# Patient Record
Sex: FEMALE | Race: WHITE | NOT HISPANIC OR LATINO | Employment: FULL TIME | URBAN - METROPOLITAN AREA
[De-identification: names, ages, dates, MRNs, and addresses within clinical notes are randomized per-mention and may not be internally consistent; named-entity substitution may affect disease eponyms.]

---

## 2021-09-10 ENCOUNTER — HOSPITAL ENCOUNTER (EMERGENCY)
Facility: HOSPITAL | Age: 56
Discharge: HOME | End: 2021-09-10
Attending: SURGERY
Payer: COMMERCIAL

## 2021-09-10 VITALS
RESPIRATION RATE: 20 BRPM | OXYGEN SATURATION: 98 % | BODY MASS INDEX: 26.73 KG/M2 | WEIGHT: 170.63 LBS | DIASTOLIC BLOOD PRESSURE: 84 MMHG | HEART RATE: 63 BPM | TEMPERATURE: 97 F | SYSTOLIC BLOOD PRESSURE: 173 MMHG

## 2021-09-10 DIAGNOSIS — R00.2 PALPITATIONS: ICD-10-CM

## 2021-09-10 DIAGNOSIS — F43.9 STRESS: Primary | ICD-10-CM

## 2021-09-10 LAB
ALBUMIN SERPL BCP-MCNC: 4.1 G/DL (ref 3.5–5.2)
ALP SERPL-CCNC: 111 U/L (ref 55–135)
ALT SERPL W/O P-5'-P-CCNC: 35 U/L (ref 10–44)
ANION GAP SERPL CALC-SCNC: 11 MMOL/L (ref 8–16)
AST SERPL-CCNC: 27 U/L (ref 10–40)
BASOPHILS # BLD AUTO: 0.03 K/UL (ref 0–0.2)
BASOPHILS NFR BLD: 0.4 % (ref 0–1.9)
BILIRUB SERPL-MCNC: 0.3 MG/DL (ref 0.1–1)
BNP SERPL-MCNC: 50 PG/ML (ref 0–99)
BUN SERPL-MCNC: 10 MG/DL (ref 6–20)
CALCIUM SERPL-MCNC: 9.5 MG/DL (ref 8.7–10.5)
CHLORIDE SERPL-SCNC: 108 MMOL/L (ref 95–110)
CK MB SERPL-MCNC: 1.3 NG/ML (ref 0.1–6.5)
CK MB SERPL-RTO: 1.3 % (ref 0–5)
CK SERPL-CCNC: 97 U/L (ref 20–180)
CK SERPL-CCNC: 97 U/L (ref 20–180)
CO2 SERPL-SCNC: 23 MMOL/L (ref 23–29)
CREAT SERPL-MCNC: 0.8 MG/DL (ref 0.5–1.4)
D DIMER PPP IA.FEU-MCNC: 0.25 MG/L FEU
DIFFERENTIAL METHOD: NORMAL
EOSINOPHIL # BLD AUTO: 0.1 K/UL (ref 0–0.5)
EOSINOPHIL NFR BLD: 1.6 % (ref 0–8)
ERYTHROCYTE [DISTWIDTH] IN BLOOD BY AUTOMATED COUNT: 12.7 % (ref 11.5–14.5)
EST. GFR  (AFRICAN AMERICAN): >60 ML/MIN/1.73 M^2
EST. GFR  (NON AFRICAN AMERICAN): >60 ML/MIN/1.73 M^2
GLUCOSE SERPL-MCNC: 81 MG/DL (ref 70–110)
HCT VFR BLD AUTO: 42.3 % (ref 37–48.5)
HGB BLD-MCNC: 13.7 G/DL (ref 12–16)
IMM GRANULOCYTES # BLD AUTO: 0.03 K/UL (ref 0–0.04)
IMM GRANULOCYTES NFR BLD AUTO: 0.4 % (ref 0–0.5)
LYMPHOCYTES # BLD AUTO: 2.3 K/UL (ref 1–4.8)
LYMPHOCYTES NFR BLD: 31.8 % (ref 18–48)
MCH RBC QN AUTO: 30 PG (ref 27–31)
MCHC RBC AUTO-ENTMCNC: 32.4 G/DL (ref 32–36)
MCV RBC AUTO: 93 FL (ref 82–98)
MONOCYTES # BLD AUTO: 0.6 K/UL (ref 0.3–1)
MONOCYTES NFR BLD: 8.3 % (ref 4–15)
NEUTROPHILS # BLD AUTO: 4.2 K/UL (ref 1.8–7.7)
NEUTROPHILS NFR BLD: 57.5 % (ref 38–73)
NRBC BLD-RTO: 0 /100 WBC
PLATELET # BLD AUTO: 243 K/UL (ref 150–450)
PMV BLD AUTO: 10.3 FL (ref 9.2–12.9)
POTASSIUM SERPL-SCNC: 4.2 MMOL/L (ref 3.5–5.1)
PROT SERPL-MCNC: 7.5 G/DL (ref 6–8.4)
RBC # BLD AUTO: 4.57 M/UL (ref 4–5.4)
SODIUM SERPL-SCNC: 142 MMOL/L (ref 136–145)
TROPONIN I SERPL DL<=0.01 NG/ML-MCNC: <0.006 NG/ML (ref 0–0.03)
WBC # BLD AUTO: 7.37 K/UL (ref 3.9–12.7)

## 2021-09-10 PROCEDURE — 36415 COLL VENOUS BLD VENIPUNCTURE: CPT | Performed by: SURGERY

## 2021-09-10 PROCEDURE — 85025 COMPLETE CBC W/AUTO DIFF WBC: CPT | Performed by: SURGERY

## 2021-09-10 PROCEDURE — 93010 ELECTROCARDIOGRAM REPORT: CPT | Mod: ,,, | Performed by: INTERNAL MEDICINE

## 2021-09-10 PROCEDURE — 82553 CREATINE MB FRACTION: CPT | Performed by: SURGERY

## 2021-09-10 PROCEDURE — 80053 COMPREHEN METABOLIC PANEL: CPT | Performed by: SURGERY

## 2021-09-10 PROCEDURE — 93010 EKG 12-LEAD: ICD-10-PCS | Mod: ,,, | Performed by: INTERNAL MEDICINE

## 2021-09-10 PROCEDURE — 99285 EMERGENCY DEPT VISIT HI MDM: CPT

## 2021-09-10 PROCEDURE — 25000003 PHARM REV CODE 250: Performed by: SURGERY

## 2021-09-10 PROCEDURE — 85379 FIBRIN DEGRADATION QUANT: CPT | Performed by: SURGERY

## 2021-09-10 PROCEDURE — 84484 ASSAY OF TROPONIN QUANT: CPT | Performed by: SURGERY

## 2021-09-10 PROCEDURE — 83880 ASSAY OF NATRIURETIC PEPTIDE: CPT | Performed by: SURGERY

## 2021-09-10 PROCEDURE — 93005 ELECTROCARDIOGRAM TRACING: CPT

## 2021-09-10 RX ORDER — ASPIRIN 325 MG
325 TABLET ORAL
Status: COMPLETED | OUTPATIENT
Start: 2021-09-10 | End: 2021-09-10

## 2021-09-10 RX ORDER — VORTIOXETINE 10 MG/1
1 TABLET, FILM COATED ORAL DAILY
COMMUNITY
Start: 2021-08-29 | End: 2023-09-12

## 2021-09-10 RX ADMIN — ASPIRIN 325 MG: 325 TABLET ORAL at 12:09

## 2023-09-12 ENCOUNTER — OFFICE VISIT (OUTPATIENT)
Dept: URGENT CARE | Facility: CLINIC | Age: 58
End: 2023-09-12
Payer: COMMERCIAL

## 2023-09-12 VITALS
TEMPERATURE: 99 F | HEART RATE: 93 BPM | RESPIRATION RATE: 16 BRPM | WEIGHT: 166.38 LBS | DIASTOLIC BLOOD PRESSURE: 75 MMHG | HEIGHT: 67 IN | OXYGEN SATURATION: 98 % | SYSTOLIC BLOOD PRESSURE: 105 MMHG | BODY MASS INDEX: 26.11 KG/M2

## 2023-09-12 DIAGNOSIS — U07.1 COVID-19: Primary | ICD-10-CM

## 2023-09-12 LAB
CTP QC/QA: YES
CTP QC/QA: YES
POC MOLECULAR INFLUENZA A AGN: NEGATIVE
POC MOLECULAR INFLUENZA B AGN: NEGATIVE
SARS-COV-2 RDRP RESP QL NAA+PROBE: POSITIVE

## 2023-09-12 PROCEDURE — 99203 OFFICE O/P NEW LOW 30 MIN: CPT | Mod: ,,,

## 2023-09-12 PROCEDURE — 99203 PR OFFICE/OUTPT VISIT, NEW, LEVL III, 30-44 MIN: ICD-10-PCS | Mod: ,,,

## 2023-09-12 PROCEDURE — 87635 SARS-COV-2 COVID-19 AMP PRB: CPT | Mod: QW,,,

## 2023-09-12 PROCEDURE — 87502 POCT INFLUENZA A/B MOLECULAR: ICD-10-PCS | Mod: QW,,,

## 2023-09-12 PROCEDURE — 87502 INFLUENZA DNA AMP PROBE: CPT | Mod: QW,,,

## 2023-09-12 PROCEDURE — 87635: ICD-10-PCS | Mod: QW,,,

## 2023-09-12 RX ORDER — BUSPIRONE HYDROCHLORIDE 5 MG/1
5 TABLET ORAL 2 TIMES DAILY PRN
COMMUNITY
Start: 2023-04-10

## 2023-09-12 RX ORDER — NIRMATRELVIR AND RITONAVIR 300-100 MG
KIT ORAL
Qty: 30 TABLET | Refills: 0 | Status: SHIPPED | OUTPATIENT
Start: 2023-09-12

## 2023-09-12 RX ORDER — NIRMATRELVIR AND RITONAVIR 300-100 MG
KIT ORAL
Qty: 30 TABLET | Refills: 0 | Status: SHIPPED | OUTPATIENT
Start: 2023-09-12 | End: 2023-09-12

## 2023-09-12 NOTE — LETTER
September 12, 2023      [facility]  [address] [address]  Phone: [phone]       Patient: Christin Ochoa   YOB: 1965  Date of Visit: 09/12/2023    To Whom It May Concern:    Tahmina Ochoa  was at [facility] on 09/12/2023. The patient may return to work/school on 09/14/2023. If you have any questions or concerns, or if I can be of further assistance, please do not hesitate to contact me.    Sincerely,    Emilee Gaines MA

## 2023-09-12 NOTE — PROGRESS NOTES
"Subjective:      Patient ID: Christin Ochoa is a 57 y.o. female.    Vitals:  height is 5' 7" (1.702 m) and weight is 75.5 kg (166 lb 6.4 oz). Her temperature is 98.7 °F (37.1 °C). Her blood pressure is 105/75 and her pulse is 93. Her respiration is 16 and oxygen saturation is 98%.     Chief Complaint: Fever (Fever, fatigue, and cough X Friday. Nasal drip X yesterday. Tylenol, Advil, and Mucinex Cold OTC with some relief. Highest fever was 103 on Friday )    Patient is a 57-year-old female who presents to urgent care clinic with complaints of fever, fatigue, nasal congestion, headache that began 4 days ago.  Patient reports associated  sinus pressure and postnasal drip, mild cough since that time.  T-max at home 103.  Patient reports COVID test.  Over-the-counter Tylenol, Advil and Mucinex cold with mild relief symptoms.  Patient has had COVID once before did not require hospitalization and has been vaccinated.  Denies neck stiffness, rash, nausea, vomiting, diarrhea, shortness breath or chest pain.      Fever   Associated symptoms include congestion and coughing.       Constitution: Positive for fever.   HENT:  Positive for congestion, postnasal drip and sinus pressure.    Respiratory:  Positive for cough.       Objective:     Physical Exam   Constitutional: She is oriented to person, place, and time. She appears well-developed. She is cooperative.  Non-toxic appearance. She does not appear ill. No distress.   HENT:   Head: Normocephalic and atraumatic.   Ears:   Right Ear: Hearing, tympanic membrane, external ear and ear canal normal.   Left Ear: Hearing, tympanic membrane, external ear and ear canal normal.   Nose: No mucosal edema or nasal deformity. No epistaxis. Right sinus exhibits no maxillary sinus tenderness and no frontal sinus tenderness. Left sinus exhibits no maxillary sinus tenderness and no frontal sinus tenderness.   Mouth/Throat: Uvula is midline, oropharynx is clear and moist and mucous membranes " are normal. No trismus in the jaw. Normal dentition. No uvula swelling. No posterior oropharyngeal edema.   Eyes: Conjunctivae and lids are normal. No scleral icterus.   Neck: Trachea normal and phonation normal. Neck supple. No edema present. No erythema present. No neck rigidity present.   Cardiovascular: Normal rate, regular rhythm, normal heart sounds and normal pulses.   Pulmonary/Chest: Effort normal and breath sounds normal. No respiratory distress. She has no decreased breath sounds. She has no wheezes. She has no rhonchi. She has no rales.   Abdominal: Normal appearance.   Musculoskeletal: Normal range of motion.         General: No deformity. Normal range of motion.   Neurological: She is alert and oriented to person, place, and time. She exhibits normal muscle tone. Coordination normal.   Skin: Skin is warm, dry, intact, not diaphoretic and not pale.   Psychiatric: Her speech is normal and behavior is normal. Judgment and thought content normal.   Nursing note and vitals reviewed.      Assessment:     1. COVID-19        Plan:       COVID-19  -     POCT COVID-19 Rapid Screening  -     POCT Influenza A/B MOLECULAR  -     Discontinue: nirmatrelvir-ritonavir (PAXLOVID) 300 mg (150 mg x 2)-100 mg copackaged tablets (EUA); Take 3 tablets by mouth 2 (two) times daily. Each dose contains 2 nirmatrelvir (pink tablets) and 1 ritonavir (white tablet). Take all 3 tablets together  Dispense: 30 tablet; Refill: 0  -     pyrilamine-chlophedianoL 12.5-12.5 mg/5 mL Liqd; Take 10 mLs by mouth 3 (three) times daily.  Dispense: 180 mL; Refill: 0  -     nirmatrelvir-ritonavir (PAXLOVID) 300 mg (150 mg x 2)-100 mg copackaged tablets (EUA); Take 3 tablets by mouth 2 (two) times daily. Each dose contains 2 nirmatrelvir (pink tablets) and 1 ritonavir (white tablet). Take all 3 tablets together  Dispense: 30 tablet; Refill: 0           Does appear that your BuSpar prescription may interact with antiviral medication.  Do not take  while taking Paxlovid, resume 3 days after completing antiviral treatment.   Over-the-counter decongestants such as Sudafed, phenylephrine or pseudoephedrine.  Fever / Body Aches: Use OTC Tylenol or Motrin per package instructions for fever or body aches.  Sore Throat: Use OTC lozenges or throat sprays, gargle with warm salt and water, warm tea with honey.   Cough:   Cover your mouth with coughing, avoid sharing cups or lip balm, wash your hand before eating or touching your face.    Please quarantine for 5 days from the onset of symptoms. On day 6 of illness, you can return to work/school as long as you have not experienced fever in the last 24 hours.   Seek emergency care for breathing difficulties, chest pain, shortness of breath, or confusion. Given the nature of this disease, severe respiratory distress can develop suddenly on day 8 or 9 of clinical course.  Follow up with you primary care doctor as needed.      Updated CDC guidelines can be found at: https://www.cdc.gov/coronavirus/2019-ncov/hcp/qtypqp-jk-uyea.html

## 2023-09-12 NOTE — PATIENT INSTRUCTIONS
Excuse through Wednesday, may return Thursday if fever free and symptoms are improving.     Does appear that your BuSpar prescription may interact with antiviral medication.  Do not take while taking Paxlovid, resume 3 days after completing antiviral treatment.   Over-the-counter decongestants such as Sudafed, phenylephrine or pseudoephedrine.  Fever / Body Aches: Use OTC Tylenol or Motrin per package instructions for fever or body aches.  Sore Throat: Use OTC lozenges or throat sprays, gargle with warm salt and water, warm tea with honey.   Cough:   Cover your mouth with coughing, avoid sharing cups or lip balm, wash your hand before eating or touching your face.    Please quarantine for 5 days from the onset of symptoms. On day 6 of illness, you can return to work/school as long as you have not experienced fever in the last 24 hours.   Seek emergency care for breathing difficulties, chest pain, shortness of breath, or confusion. Given the nature of this disease, severe respiratory distress can develop suddenly on day 8 or 9 of clinical course.  Follow up with you primary care doctor as needed.      Updated CDC guidelines can be found at: https://www.cdc.gov/coronavirus/2019-ncov/hcp/igbzwu-cy-hbgg.html

## 2023-09-23 ENCOUNTER — OFFICE VISIT (OUTPATIENT)
Dept: URGENT CARE | Facility: CLINIC | Age: 58
End: 2023-09-23
Payer: COMMERCIAL

## 2023-09-23 VITALS
BODY MASS INDEX: 26.06 KG/M2 | OXYGEN SATURATION: 98 % | SYSTOLIC BLOOD PRESSURE: 100 MMHG | TEMPERATURE: 98 F | RESPIRATION RATE: 17 BRPM | HEIGHT: 67 IN | WEIGHT: 166 LBS | DIASTOLIC BLOOD PRESSURE: 72 MMHG | HEART RATE: 74 BPM

## 2023-09-23 DIAGNOSIS — R05.9 COUGH, UNSPECIFIED TYPE: ICD-10-CM

## 2023-09-23 DIAGNOSIS — J32.9 BACTERIAL SINUSITIS: Primary | ICD-10-CM

## 2023-09-23 DIAGNOSIS — B96.89 BACTERIAL SINUSITIS: Primary | ICD-10-CM

## 2023-09-23 DIAGNOSIS — J02.9 SORE THROAT: ICD-10-CM

## 2023-09-23 DIAGNOSIS — R07.89 CHEST HEAVINESS: ICD-10-CM

## 2023-09-23 DIAGNOSIS — Z86.16 HISTORY OF COVID-19: ICD-10-CM

## 2023-09-23 LAB
CTP QC/QA: YES
CTP QC/QA: YES
EKG 12-LEAD: NORMAL
MOLECULAR STREP A: NEGATIVE
POC MOLECULAR INFLUENZA A AGN: NEGATIVE
POC MOLECULAR INFLUENZA B AGN: NEGATIVE
PR INTERVAL: NORMAL
PRT AXES: NORMAL
QRS DURATION: NORMAL
QT/QTC: NORMAL
VENTRICULAR RATE: NORMAL

## 2023-09-23 PROCEDURE — 93000 POCT EKG 12-LEAD: ICD-10-PCS | Mod: ,,, | Performed by: FAMILY MEDICINE

## 2023-09-23 PROCEDURE — 93000 ELECTROCARDIOGRAM COMPLETE: CPT | Mod: ,,, | Performed by: FAMILY MEDICINE

## 2023-09-23 PROCEDURE — 87651 STREP A DNA AMP PROBE: CPT | Mod: QW,,, | Performed by: FAMILY MEDICINE

## 2023-09-23 PROCEDURE — 99214 PR OFFICE/OUTPT VISIT, EST, LEVL IV, 30-39 MIN: ICD-10-PCS | Mod: 25,,, | Performed by: FAMILY MEDICINE

## 2023-09-23 PROCEDURE — 87651 POCT STREP A MOLECULAR: ICD-10-PCS | Mod: QW,,, | Performed by: FAMILY MEDICINE

## 2023-09-23 PROCEDURE — 96372 PR INJECTION,THERAP/PROPH/DIAG2ST, IM OR SUBCUT: ICD-10-PCS | Mod: ,,, | Performed by: FAMILY MEDICINE

## 2023-09-23 PROCEDURE — 87502 POCT INFLUENZA A/B MOLECULAR: ICD-10-PCS | Mod: QW,,, | Performed by: FAMILY MEDICINE

## 2023-09-23 PROCEDURE — 87502 INFLUENZA DNA AMP PROBE: CPT | Mod: QW,,, | Performed by: FAMILY MEDICINE

## 2023-09-23 PROCEDURE — 96372 THER/PROPH/DIAG INJ SC/IM: CPT | Mod: ,,, | Performed by: FAMILY MEDICINE

## 2023-09-23 PROCEDURE — 99214 OFFICE O/P EST MOD 30 MIN: CPT | Mod: 25,,, | Performed by: FAMILY MEDICINE

## 2023-09-23 RX ORDER — PREDNISONE 10 MG/1
30 TABLET ORAL DAILY
Qty: 12 TABLET | Refills: 0 | Status: SHIPPED | OUTPATIENT
Start: 2023-09-23 | End: 2023-09-27

## 2023-09-23 RX ORDER — DOXYCYCLINE 100 MG/1
100 CAPSULE ORAL 2 TIMES DAILY
Qty: 14 CAPSULE | Refills: 0 | Status: SHIPPED | OUTPATIENT
Start: 2023-09-23 | End: 2023-09-30

## 2023-09-23 RX ORDER — DEXAMETHASONE SODIUM PHOSPHATE 100 MG/10ML
10 INJECTION INTRAMUSCULAR; INTRAVENOUS
Status: COMPLETED | OUTPATIENT
Start: 2023-09-23 | End: 2023-09-23

## 2023-09-23 RX ORDER — PROMETHAZINE HYDROCHLORIDE AND DEXTROMETHORPHAN HYDROBROMIDE 6.25; 15 MG/5ML; MG/5ML
5 SYRUP ORAL EVERY 6 HOURS PRN
Qty: 120 ML | Refills: 0 | Status: SHIPPED | OUTPATIENT
Start: 2023-09-23 | End: 2023-09-29

## 2023-09-23 RX ADMIN — DEXAMETHASONE SODIUM PHOSPHATE 10 MG: 100 INJECTION INTRAMUSCULAR; INTRAVENOUS at 12:09

## 2023-09-23 NOTE — LETTER
September 23, 2023      [facility]  [address] [address]  Phone: [phone]       Patient: Christin Ochoa   YOB: 1965  Date of Visit: 09/23/2023    To Whom It May Concern:    Tahmina Ochoa  was at [facility] on 09/23/2023. She may return to work/school on  09/27/2023 with no restrictions. If you have any questions or concerns, or if I can be of further assistance, please do not hesitate to contact me.    Sincerely,    Alfred Olmedo, RT

## 2023-09-23 NOTE — PATIENT INSTRUCTIONS
Medications sent to pharmacy  Start antibiotics today  Start oral steroids tomorrow morning  Cough syrup may cause drowsiness.  Do not drink alcohol or drive when taking it  Start taking an allergy pill daily such as claritin, zyrtec, allegrea or xyzal. Also start using a nasal steroid spray such as flonase or nasacort daily. If you are not being treated for high blood pressure, you can also take decongestant such as sudafed as needed. They can be purchased over the counter. If oral steroids were prescribed, start them tomorrow morning. Monitor for fever. Take tylenol/acetaminophen or ibuprofen as needed. Rest and hydrate. If symptoms persist or worsen, return to clinic or seek medical attention immediately.

## 2023-09-23 NOTE — PROGRESS NOTES
"Subjective:      Patient ID: Christin Ochoa is a 57 y.o. female.    Vitals:  height is 5' 7" (1.702 m) and weight is 75.3 kg (166 lb). Her temperature is 98.4 °F (36.9 °C). Her blood pressure is 100/72 and her pulse is 74. Her respiration is 17 and oxygen saturation is 98%.     Chief Complaint: Cough ( Patient is a 57 y.o. female who presents to urgent care with complaints of cough, chest congestion, dizzy, sob, body aches, sore throat, nausea, diarrhea since the 09/12/23 since dx with covid.  Patient denies vomiting. )     Patient is a 57 y.o. female who presents to urgent care with complaints of cough, chest congestion, dizzy, sob, body aches, sore throat, nausea, diarrhea since the 09/12/23 since dx with covid.  Patient denies vomiting.  Denies any shortness a breath or recent fever.  Concerned about pneumonia.  Patient states she is been going nonstop since returning from her quarantine.  Feeling run down.    Cough      Constitution: Negative.   HENT:  Positive for congestion.    Cardiovascular: Negative.    Eyes: Negative.    Respiratory:  Positive for cough.    Gastrointestinal: Negative.    Genitourinary: Negative.    Musculoskeletal: Negative.    Skin: Negative.    Allergic/Immunologic: Negative.    Neurological: Negative.    Hematologic/Lymphatic: Negative.       Objective:     Physical Exam   Constitutional: She is oriented to person, place, and time. She appears well-developed. She is cooperative.  Non-toxic appearance. She does not appear ill. No distress.   HENT:   Head: Normocephalic and atraumatic.   Ears:   Right Ear: Hearing and external ear normal.   Left Ear: Hearing and external ear normal.   Mouth/Throat: Mucous membranes are normal. Posterior oropharyngeal erythema (heavy postnasal drip) present.   Eyes: Conjunctivae and lids are normal.   Neck: Trachea normal and phonation normal. Neck supple. No edema present. No erythema present. No neck rigidity present.   Cardiovascular: Normal rate. "   Pulmonary/Chest: Effort normal and breath sounds normal. No stridor. No respiratory distress. She has no decreased breath sounds. She has no wheezes. She has no rhonchi. She has no rales.   Abdominal: Normal appearance.   Lymphadenopathy:     She has cervical adenopathy.   Neurological: She is alert and oriented to person, place, and time. She exhibits normal muscle tone. Coordination normal.   Skin: Skin is warm, dry, intact, not diaphoretic and no rash.   Psychiatric: Her speech is normal and behavior is normal. Mood, judgment and thought content normal.   Nursing note and vitals reviewed.       Previous History      Review of patient's allergies indicates:  No Known Allergies    Past Medical History:   Diagnosis Date    Anxiety      Current Outpatient Medications   Medication Instructions    busPIRone (BUSPAR) 5 mg, Oral, 2 times daily PRN    doxycycline (MONODOX) 100 mg, Oral, 2 times daily    nirmatrelvir-ritonavir (PAXLOVID) 300 mg (150 mg x 2)-100 mg copackaged tablets (EUA) Take 3 tablets by mouth 2 (two) times daily. Each dose contains 2 nirmatrelvir (pink tablets) and 1 ritonavir (white tablet). Take all 3 tablets together    predniSONE (DELTASONE) 30 mg, Oral, Daily    promethazine-dextromethorphan (PROMETHAZINE-DM) 6.25-15 mg/5 mL Syrp 5 mLs, Oral, Every 6 hours PRN    pyrilamine-chlophedianoL 12.5-12.5 mg/5 mL Liqd 10 mLs, Oral, 3 times daily     Past Surgical History:   Procedure Laterality Date     SECTION      x 4    HYSTERECTOMY      TONSILLECTOMY       Family History   Problem Relation Age of Onset    Heart disease Mother     Diabetes Mother     No Known Problems Father     No Known Problems Sister     No Known Problems Brother        Social History     Tobacco Use    Smoking status: Never    Smokeless tobacco: Never   Substance Use Topics    Alcohol use: Yes     Alcohol/week: 0.8 standard drinks of alcohol     Types: 1 Standard drinks or equivalent per week    Drug use: No         "Physical Exam      Vital Signs Reviewed   /72   Pulse 74   Temp 98.4 °F (36.9 °C)   Resp 17   Ht 5' 7" (1.702 m)   Wt 75.3 kg (166 lb)   LMP 12/02/2013   SpO2 98%   BMI 26.00 kg/m²        Procedures    Procedures     Labs     Results for orders placed or performed in visit on 09/23/23   POCT Strep A, Molecular   Result Value Ref Range    Molecular Strep A, POC Negative Negative     Acceptable Yes    POCT Influenza A/B Molecular   Result Value Ref Range    POC Molecular Influenza A Ag Negative Negative, Not Reported    POC Molecular Influenza B Ag Negative Negative, Not Reported     Acceptable Yes    POCT EKG 12-LEAD (NOT FOR [facility] USE)   Result Value Ref Range    EKG 12-Lead      Ventricular Rate      IA Interval      QRS Duration      QT/QTc      PRT Axes           Assessment:     1. Bacterial sinusitis    2. Sore throat    3. Chest heaviness    4. Cough, unspecified type    5. History of COVID-19        Plan:   Chest x-ray negative for pneumonia  EKG no acute ST changes  Medications sent to pharmacy  Start antibiotics today  Start oral steroids tomorrow morning  Cough syrup may cause drowsiness.  Do not drink alcohol or drive when taking it  Start taking an allergy pill daily such as claritin, zyrtec, allegrea or xyzal. Also start using a nasal steroid spray such as flonase or nasacort daily. If you are not being treated for high blood pressure, you can also take decongestant such as sudafed as needed. They can be purchased over the counter. If oral steroids were prescribed, start them tomorrow morning. Monitor for fever. Take tylenol/acetaminophen or ibuprofen as needed. Rest and hydrate. If symptoms persist or worsen, return to clinic or seek medical attention immediately.     Bacterial sinusitis    Sore throat  -     POCT Strep A, Molecular  -     POCT Influenza A/B Molecular    Chest heaviness  -     POCT EKG 12-LEAD (NOT FOR [facility] USE)    Cough, unspecified " type  -     X-Ray Chest PA And Lateral; Future; Expected date: 09/23/2023    History of COVID-19  -     X-Ray Chest PA And Lateral; Future; Expected date: 09/23/2023    Other orders  -     doxycycline (MONODOX) 100 MG capsule; Take 1 capsule (100 mg total) by mouth 2 (two) times daily. for 7 days  Dispense: 14 capsule; Refill: 0  -     dexAMETHasone injection 10 mg  -     promethazine-dextromethorphan (PROMETHAZINE-DM) 6.25-15 mg/5 mL Syrp; Take 5 mLs by mouth every 6 (six) hours as needed (cough).  Dispense: 120 mL; Refill: 0  -     predniSONE (DELTASONE) 10 MG tablet; Take 3 tablets (30 mg total) by mouth once daily. for 4 days  Dispense: 12 tablet; Refill: 0

## 2024-03-27 ENCOUNTER — OFFICE VISIT (OUTPATIENT)
Dept: URGENT CARE | Facility: CLINIC | Age: 59
End: 2024-03-27
Payer: COMMERCIAL

## 2024-03-27 VITALS
TEMPERATURE: 98 F | OXYGEN SATURATION: 100 % | DIASTOLIC BLOOD PRESSURE: 89 MMHG | HEART RATE: 74 BPM | BODY MASS INDEX: 26.06 KG/M2 | WEIGHT: 166 LBS | SYSTOLIC BLOOD PRESSURE: 131 MMHG | RESPIRATION RATE: 18 BRPM | HEIGHT: 67 IN

## 2024-03-27 DIAGNOSIS — R07.89 CHEST TIGHTNESS: ICD-10-CM

## 2024-03-27 DIAGNOSIS — R00.2 PALPITATIONS: Primary | ICD-10-CM

## 2024-03-27 LAB
EKG 12-LEAD: NORMAL
PR INTERVAL: NORMAL
PRT AXES: NORMAL
QRS DURATION: NORMAL
QT/QTC: NORMAL
VENTRICULAR RATE: NORMAL

## 2024-03-27 PROCEDURE — 93000 ELECTROCARDIOGRAM COMPLETE: CPT | Mod: ,,,

## 2024-03-27 PROCEDURE — 99213 OFFICE O/P EST LOW 20 MIN: CPT | Mod: 25,,,

## 2024-03-27 NOTE — PROGRESS NOTES
"Subjective:      Patient ID: Christin Ochoa is a 58 y.o. female.    Vitals:  height is 5' 7" (1.702 m) and weight is 75.3 kg (166 lb). Her temperature is 98 °F (36.7 °C). Her blood pressure is 131/89 and her pulse is 74. Her respiration is 18 and oxygen saturation is 100%.     Chief Complaint: Palpitations (High palpitations(90*), chest tightness  x last night no meds taken /Denies numbness tingling feeling /)    Patient is a 58-year-old female with a history of anxiety currently on BuSpar that presents complaining of chest tightness and palpitations since last night.  States she was using an ney that when she would feel the palpitation coming on (which she describes as a "jermain") it would show her heart rate elevated, highest that she noted was 90 which she states is high for her because her resting heart rate is usually 60.  States that this has happened a few times over the last few weeks and she noted that the heart rate has got as high as 105.  She states that her cardiologist is trying to fit her in today, she does not want to go to emergency room so came here to get an EKG to make sure it does not show anything that would warrant her to go to the ER right now.     She denies any shortness of breath, dizziness, radiating pain, numbness or tingling, n/v but does admit to occasional belching.      Palpitations   Pertinent negatives include no dizziness or shortness of breath.       Cardiovascular:  Positive for palpitations.   Respiratory:  Positive for chest tightness. Negative for shortness of breath.    Neurological:  Negative for dizziness.      Objective:     Physical Exam   Constitutional: She is oriented to person, place, and time.  Non-toxic appearance.   HENT:   Nose: Nose normal.   Mouth/Throat: Mucous membranes are moist. Oropharynx is clear.   Cardiovascular: Normal rate, regular rhythm, normal heart sounds and normal pulses.   Pulmonary/Chest: Effort normal and breath sounds normal.   Abdominal: " Soft. There is no abdominal tenderness.   Musculoskeletal:      Right lower leg: No edema.      Left lower leg: No edema.   Neurological: She is alert, oriented to person, place, and time and at baseline.   Skin: Skin is warm, not diaphoretic and no rash.   Nursing note and vitals reviewed.      Assessment:     1. Palpitations    2. Chest tightness        Plan:     EK BMP sinus rhythm     Palpitations  -     POCT EKG 12-LEAD (NOT FOR [facility] USE)    Chest tightness  -     POCT EKG 12-LEAD (NOT FOR [facility] USE)      EKG results discussed with patient.  Discussed that this is the extent of what can be done at urgent care and it is recommended that she go to emergency room for further assessment and treatment such as lab work.

## 2024-04-05 ENCOUNTER — TELEPHONE (OUTPATIENT)
Dept: URGENT CARE | Facility: CLINIC | Age: 59
End: 2024-04-05

## 2024-05-14 ENCOUNTER — TELEPHONE (OUTPATIENT)
Dept: PRIMARY CARE CLINIC | Facility: CLINIC | Age: 59
End: 2024-05-14
Payer: COMMERCIAL

## 2024-05-14 NOTE — TELEPHONE ENCOUNTER
2024 @840    Are there any outstanding tasks inpatient's chart (ex. Labs)  NEW ;PATIENT APPT   Are there any outstanding/pending referrals?  New patient   Has the patient been seen in the ED, UC, or has been admitted to the hospital since their last office visit?  Has the patient seen any other health care providers since their last office visit  Has the patient had any outside lab work including images since their last office visit?       Clinic Use    6. Is the patient's Pneumonia Vaccine Current?   Prevnar 13:     Prevnar 20:     Pneumovax 23:      7. When was the patients last   Colonoscopy:   Mammogram:   Cervical Screenin. When was the patients last diabetic screening?   A1c:    Micro:    Eye Exam:        Please remind pt to bring in medications in the bottles and to [facility]

## 2024-05-21 ENCOUNTER — LAB VISIT (OUTPATIENT)
Dept: LAB | Facility: HOSPITAL | Age: 59
End: 2024-05-21
Attending: STUDENT IN AN ORGANIZED HEALTH CARE EDUCATION/TRAINING PROGRAM
Payer: COMMERCIAL

## 2024-05-21 ENCOUNTER — OFFICE VISIT (OUTPATIENT)
Dept: PRIMARY CARE CLINIC | Facility: CLINIC | Age: 59
End: 2024-05-21
Payer: COMMERCIAL

## 2024-05-21 ENCOUNTER — TELEPHONE (OUTPATIENT)
Dept: PRIMARY CARE CLINIC | Facility: CLINIC | Age: 59
End: 2024-05-21

## 2024-05-21 VITALS
BODY MASS INDEX: 27.62 KG/M2 | HEIGHT: 67 IN | OXYGEN SATURATION: 100 % | RESPIRATION RATE: 20 BRPM | SYSTOLIC BLOOD PRESSURE: 125 MMHG | DIASTOLIC BLOOD PRESSURE: 83 MMHG | TEMPERATURE: 98 F | HEART RATE: 76 BPM | WEIGHT: 176 LBS

## 2024-05-21 DIAGNOSIS — Z90.710 H/O TOTAL HYSTERECTOMY: ICD-10-CM

## 2024-05-21 DIAGNOSIS — R63.5 WEIGHT GAIN: Primary | ICD-10-CM

## 2024-05-21 DIAGNOSIS — Z00.00 WELLNESS EXAMINATION: ICD-10-CM

## 2024-05-21 DIAGNOSIS — Z78.0 POST-MENOPAUSAL: ICD-10-CM

## 2024-05-21 DIAGNOSIS — Z76.89 ENCOUNTER TO ESTABLISH CARE WITH NEW DOCTOR: Primary | ICD-10-CM

## 2024-05-21 DIAGNOSIS — R63.5 WEIGHT GAIN: ICD-10-CM

## 2024-05-21 DIAGNOSIS — Z76.89 ENCOUNTER TO ESTABLISH CARE WITH NEW DOCTOR: ICD-10-CM

## 2024-05-21 DIAGNOSIS — Z12.31 ENCOUNTER FOR SCREENING MAMMOGRAM FOR BREAST CANCER: ICD-10-CM

## 2024-05-21 DIAGNOSIS — F41.9 ANXIETY: ICD-10-CM

## 2024-05-21 DIAGNOSIS — E66.3 OVERWEIGHT (BMI 25.0-29.9): ICD-10-CM

## 2024-05-21 PROBLEM — I36.1 NONRHEUMATIC TRICUSPID (VALVE) INSUFFICIENCY: Chronic | Status: ACTIVE | Noted: 2017-11-20

## 2024-05-21 PROBLEM — E78.5 DYSLIPIDEMIA: Status: ACTIVE | Noted: 2017-11-20

## 2024-05-21 PROBLEM — E78.5 DYSLIPIDEMIA: Chronic | Status: ACTIVE | Noted: 2017-11-20

## 2024-05-21 PROBLEM — I36.1 NONRHEUMATIC TRICUSPID (VALVE) INSUFFICIENCY: Status: ACTIVE | Noted: 2017-11-20

## 2024-05-21 LAB
EST. AVERAGE GLUCOSE BLD GHB EST-MCNC: 114 MG/DL
ESTRADIOL SERPL HS-MCNC: <24 PG/ML
FSH SERPL-ACNC: 52.02 MIU/ML
HBA1C MFR BLD: 5.6 %
LH SERPL-ACNC: 18.79 MIU/ML
PROGEST SERPL-MCNC: <0.5 NG/ML
TESTOST SERPL-MCNC: 22.26 NG/DL (ref 12.4–35.75)

## 2024-05-21 PROCEDURE — 84403 ASSAY OF TOTAL TESTOSTERONE: CPT

## 2024-05-21 PROCEDURE — 3008F BODY MASS INDEX DOCD: CPT | Mod: CPTII,,, | Performed by: STUDENT IN AN ORGANIZED HEALTH CARE EDUCATION/TRAINING PROGRAM

## 2024-05-21 PROCEDURE — 3074F SYST BP LT 130 MM HG: CPT | Mod: CPTII,,, | Performed by: STUDENT IN AN ORGANIZED HEALTH CARE EDUCATION/TRAINING PROGRAM

## 2024-05-21 PROCEDURE — 84144 ASSAY OF PROGESTERONE: CPT

## 2024-05-21 PROCEDURE — 83001 ASSAY OF GONADOTROPIN (FSH): CPT

## 2024-05-21 PROCEDURE — 1159F MED LIST DOCD IN RCRD: CPT | Mod: CPTII,,, | Performed by: STUDENT IN AN ORGANIZED HEALTH CARE EDUCATION/TRAINING PROGRAM

## 2024-05-21 PROCEDURE — 1160F RVW MEDS BY RX/DR IN RCRD: CPT | Mod: CPTII,,, | Performed by: STUDENT IN AN ORGANIZED HEALTH CARE EDUCATION/TRAINING PROGRAM

## 2024-05-21 PROCEDURE — 99386 PREV VISIT NEW AGE 40-64: CPT | Mod: ,,, | Performed by: STUDENT IN AN ORGANIZED HEALTH CARE EDUCATION/TRAINING PROGRAM

## 2024-05-21 PROCEDURE — 83002 ASSAY OF GONADOTROPIN (LH): CPT

## 2024-05-21 PROCEDURE — 36415 COLL VENOUS BLD VENIPUNCTURE: CPT

## 2024-05-21 PROCEDURE — 82670 ASSAY OF TOTAL ESTRADIOL: CPT

## 2024-05-21 PROCEDURE — 3079F DIAST BP 80-89 MM HG: CPT | Mod: CPTII,,, | Performed by: STUDENT IN AN ORGANIZED HEALTH CARE EDUCATION/TRAINING PROGRAM

## 2024-05-21 PROCEDURE — 83036 HEMOGLOBIN GLYCOSYLATED A1C: CPT

## 2024-05-21 RX ORDER — PHENTERMINE HYDROCHLORIDE 37.5 MG/1
37.5 TABLET ORAL
Qty: 30 TABLET | Refills: 2 | Status: SHIPPED | OUTPATIENT
Start: 2024-05-21

## 2024-05-21 RX ORDER — BUSPIRONE HYDROCHLORIDE 5 MG/1
5 TABLET ORAL 2 TIMES DAILY PRN
Qty: 30 TABLET | Refills: 11 | Status: SHIPPED | OUTPATIENT
Start: 2024-05-21 | End: 2025-05-21

## 2024-05-21 NOTE — ASSESSMENT & PLAN NOTE
Stable, improved   Continue Buspar  Encouraged self coping mechanisms (deep breathing, exercise, yoga, meditation, etc)

## 2024-05-21 NOTE — PROGRESS NOTES
ANNUAL WELLNESS NOTE    Chief Complaint:  Establish Care     HPI:  Christin cOhoa is a 58 y.o. female    -------------------------------------    Anxiety     Patient Care Team:  Daylin Tom MD as PCP - General (Internal Medicine)  Ezequiel Castaneda MD as Consulting Physician (Cardiology)    Presents today to establish care and for a wellness visit. Describes overall health as good. Diet is not healthy - work in progress. Exercises never. Tobacco use: never. Alcohol use: <5 drinks/wk. Caffeine intake: 2 caffeinated drinks daily. Eye exam: annually (wears glasses). Dental exam: twice annually. Reviewed wellness labs with patient, received by Cardiology. Glucose was borderline. History of DM. Endorsed weight gain/inability to lose weight. History of total hysterectomy >3 years ago. Interested in HRT.     Review of Systems   Constitutional:  Positive for unexpected weight change. Negative for chills and fever.   Respiratory:  Negative for cough.    Cardiovascular:  Negative for chest pain.   Gastrointestinal:  Negative for abdominal pain and vomiting.   Genitourinary:  Negative for dysuria, hematuria and hot flashes.   Psychiatric/Behavioral:  The patient is not nervous/anxious.      Physical Exam  Vitals and nursing note reviewed.   Constitutional:       General: She is not in acute distress.     Appearance: Normal appearance. She is not ill-appearing.   HENT:      Head: Normocephalic.      Nose: Nose normal. No rhinorrhea.      Mouth/Throat:      Mouth: Mucous membranes are moist.   Eyes:      General: No scleral icterus.     Conjunctiva/sclera: Conjunctivae normal.   Cardiovascular:      Rate and Rhythm: Normal rate and regular rhythm.   Pulmonary:      Effort: Pulmonary effort is normal. No respiratory distress.   Musculoskeletal:         General: No deformity.   Skin:     General: Skin is warm and dry.      Coloration: Skin is not jaundiced.   Neurological:      Mental Status: She is alert and oriented to  person, place, and time. Mental status is at baseline.      Cranial Nerves: No cranial nerve deficit.      Gait: Gait normal.   Psychiatric:         Mood and Affect: Mood normal.         Behavior: Behavior normal.       Assessment/Plan:  1. Encounter to establish care with new doctor  Comments:  Reviewed medical history and reconciled medications  Reviewed wellness labs from cardiology recently, obtain A1c  Requested records from previous providers  Orders:  -     Hemoglobin A1C; Future; Expected date: 05/21/2024    2. Wellness examination  Assessment & Plan:  Routine Health Maintenance   Exercise as tolerated, >30 minutes a day for 3-5 days a week.  Counseled patient on compliance with medications and healthy diet.     Age-Appropriate Screening  Vaccinations:    - Flu: Season Ended   - Pneumonia: N/A   - Shingles (>50): Recommended 2 dose series at local pharmacy   - Tdap: UTD, requested records   - COVID: 2 dose series completed    Screening:   - Pap Smear (21-65): Hysterectomy    - Mammogram (40-50 discuss w/ pt, all >50): Ordered today   - Osteoporosis (all>65, sooner if risk factors): Ordered today   - Lung Ca. Screening (50-80 if >20 pack yrs current or quit <15 yrs): N/A   - Colon Ca. Screening (age >45): UTD, requested records   - Hep. C, HIV: Negative    Orders:  -     Hemoglobin A1C; Future; Expected date: 05/21/2024    3. Anxiety  Assessment & Plan:  Stable, improved   Continue Buspar  Encouraged self coping mechanisms (deep breathing, exercise, yoga, meditation, etc)       Orders:  -     busPIRone (BUSPAR) 5 MG Tab; Take 1 tablet (5 mg total) by mouth 2 (two) times daily as needed (anxiety).  Dispense: 30 tablet; Refill: 11    4. H/O total hysterectomy  -     Ambulatory referral/consult to Gynecology; Future; Expected date: 05/28/2024  -     Estradiol; Future; Expected date: 05/21/2024  -     Luteinizing Hormone; Future; Expected date: 05/21/2024  -     Progesterone; Future; Expected date: 05/21/2024  -      Follicle Stimulating Hormone; Future; Expected date: 05/21/2024  -     DXA Bone Density Axial Skeleton 1 or more sites; Future; Expected date: 05/21/2024  -     Testosterone; Future; Expected date: 05/21/2024    5. Post-menopausal  -     Ambulatory referral/consult to Gynecology; Future; Expected date: 05/28/2024  -     Estradiol; Future; Expected date: 05/21/2024  -     Luteinizing Hormone; Future; Expected date: 05/21/2024  -     Progesterone; Future; Expected date: 05/21/2024  -     Follicle Stimulating Hormone; Future; Expected date: 05/21/2024  -     DXA Bone Density Axial Skeleton 1 or more sites; Future; Expected date: 05/21/2024    6. Weight gain  -     Ambulatory referral/consult to Gynecology; Future; Expected date: 05/28/2024  -     Estradiol; Future; Expected date: 05/21/2024  -     Luteinizing Hormone; Future; Expected date: 05/21/2024  -     Progesterone; Future; Expected date: 05/21/2024  -     Follicle Stimulating Hormone; Future; Expected date: 05/21/2024  -     Testosterone; Future; Expected date: 05/21/2024    7. Encounter for screening mammogram for breast cancer  -     Mammo Digital Screening Bilat w/ Tenzin; Future; Expected date: 05/21/2024    Follow up in about 1 year (around 5/21/2025) for Wellness.

## 2024-05-21 NOTE — ASSESSMENT & PLAN NOTE
Routine Health Maintenance   Exercise as tolerated, >30 minutes a day for 3-5 days a week.  Counseled patient on compliance with medications and healthy diet.     Age-Appropriate Screening  Vaccinations:    - Flu: Season Ended   - Pneumonia: N/A   - Shingles (>50): Recommended 2 dose series at local pharmacy   - Tdap: UTD, requested records   - COVID: 2 dose series completed    Screening:   - Pap Smear (21-65): Hysterectomy    - Mammogram (40-50 discuss w/ pt, all >50): Ordered today   - Osteoporosis (all>65, sooner if risk factors): Ordered today   - Lung Ca. Screening (50-80 if >20 pack yrs current or quit <15 yrs): N/A   - Colon Ca. Screening (age >45): UTD, requested records   - Hep. C, HIV: Negative

## 2024-08-26 PROBLEM — Z00.00 WELLNESS EXAMINATION: Chronic | Status: RESOLVED | Noted: 2024-05-21 | Resolved: 2024-08-26

## 2025-02-28 ENCOUNTER — OFFICE VISIT (OUTPATIENT)
Dept: PRIMARY CARE CLINIC | Facility: CLINIC | Age: 60
End: 2025-02-28
Payer: COMMERCIAL

## 2025-02-28 VITALS
RESPIRATION RATE: 16 BRPM | SYSTOLIC BLOOD PRESSURE: 109 MMHG | TEMPERATURE: 98 F | BODY MASS INDEX: 27.65 KG/M2 | WEIGHT: 176.19 LBS | HEART RATE: 93 BPM | HEIGHT: 67 IN | DIASTOLIC BLOOD PRESSURE: 76 MMHG | OXYGEN SATURATION: 97 %

## 2025-02-28 DIAGNOSIS — R06.2 WHEEZING: ICD-10-CM

## 2025-02-28 DIAGNOSIS — E66.3 OVERWEIGHT (BMI 25.0-29.9): Chronic | ICD-10-CM

## 2025-02-28 DIAGNOSIS — R11.0 NAUSEA: ICD-10-CM

## 2025-02-28 DIAGNOSIS — U07.1 COVID-19: Primary | ICD-10-CM

## 2025-02-28 DIAGNOSIS — J01.90 ACUTE RHINOSINUSITIS: ICD-10-CM

## 2025-02-28 RX ORDER — IPRATROPIUM BROMIDE AND ALBUTEROL SULFATE 2.5; .5 MG/3ML; MG/3ML
3 SOLUTION RESPIRATORY (INHALATION) EVERY 6 HOURS PRN
Qty: 75 ML | Refills: 0 | Status: SHIPPED | OUTPATIENT
Start: 2025-02-28 | End: 2026-02-28

## 2025-02-28 RX ORDER — METHYLPREDNISOLONE 4 MG/1
TABLET ORAL
Qty: 21 EACH | Refills: 0 | Status: SHIPPED | OUTPATIENT
Start: 2025-02-28 | End: 2025-03-21

## 2025-02-28 RX ORDER — ALBUTEROL SULFATE 90 UG/1
2 INHALANT RESPIRATORY (INHALATION) EVERY 6 HOURS PRN
Qty: 18 G | Refills: 0 | Status: SHIPPED | OUTPATIENT
Start: 2025-02-28 | End: 2026-02-28

## 2025-02-28 RX ORDER — DEXAMETHASONE SODIUM PHOSPHATE 4 MG/ML
8 INJECTION, SOLUTION INTRA-ARTICULAR; INTRALESIONAL; INTRAMUSCULAR; INTRAVENOUS; SOFT TISSUE
Status: DISCONTINUED | OUTPATIENT
Start: 2025-02-28 | End: 2025-02-28

## 2025-02-28 RX ORDER — AMOXICILLIN AND CLAVULANATE POTASSIUM 875; 125 MG/1; MG/1
1 TABLET, FILM COATED ORAL EVERY 12 HOURS
Qty: 14 TABLET | Refills: 0 | Status: SHIPPED | OUTPATIENT
Start: 2025-02-28 | End: 2025-03-07

## 2025-02-28 RX ORDER — ONDANSETRON 4 MG/1
4 TABLET, ORALLY DISINTEGRATING ORAL EVERY 8 HOURS PRN
Qty: 30 TABLET | Refills: 2 | Status: SHIPPED | OUTPATIENT
Start: 2025-02-28

## 2025-02-28 RX ORDER — DEXAMETHASONE SODIUM PHOSPHATE 10 MG/ML
10 INJECTION INTRAMUSCULAR; INTRAVENOUS
Status: SHIPPED | OUTPATIENT
Start: 2025-02-28

## 2025-02-28 NOTE — PROGRESS NOTES
Subjective:       Patient ID: Christin Ochoa is a 59 y.o. female.    -------------------------------------    Anxiety      Chief Complaint: Cough, Nasal Congestion, Nausea, Generalized Body Aches, and Wheezing    History of Present Illness    CHIEF COMPLAINT:  Patient presents today for wheezing, nausea, body aches, and fever.    HISTORY OF PRESENT ILLNESS:  She has had worsening symptoms for over a week. She had a fever over the weekend that broke on Sunday. Currently experiencing cold sweats, clammy feeling, and nausea with heartburn sensation. She reports poor appetite with minimal food intake, only consuming a small amount of Rice Krispie cereal today. She has a splitting headache which she associates with the recent fever. She describes body aches and fatigue, comparing the sensation to feeling sore after running a marathon. She denies vomiting, nasal congestion, or sinus tenderness. She has known sick contacts at home but notes high rates of illness at her child's school.    COVID HISTORY:  She has a history of prior COVID infection and reports current symptoms feel similar to her previous COVID experience.    MEDICATIONS:  She has an unused Adipex prescription which she has not taken due to concerns about heart racing, despite normal cardiac workup. Would like to discuss with doctor about possible starting it.        Review of Systems   Constitutional:  Positive for chills and fever.   HENT:  Positive for nasal congestion, rhinorrhea and sinus pressure/congestion.    Respiratory:  Positive for wheezing.    Gastrointestinal:  Positive for nausea. Negative for abdominal pain and vomiting.   Genitourinary:  Negative for dysuria and hematuria.         Objective:      Physical Exam  Vitals and nursing note reviewed.   Constitutional:       General: She is not in acute distress.     Appearance: She is ill-appearing.   HENT:      Right Ear: External ear normal. No middle ear effusion. Tympanic membrane is  erythematous.      Left Ear: External ear normal.  No middle ear effusion. Tympanic membrane is erythematous.      Nose: Congestion and rhinorrhea present.      Right Turbinates: Enlarged and swollen.      Left Turbinates: Enlarged and swollen.      Right Sinus: Frontal sinus tenderness present.      Left Sinus: Frontal sinus tenderness present.      Mouth/Throat:      Mouth: Mucous membranes are moist.      Pharynx: Posterior oropharyngeal erythema present.   Eyes:      General: No scleral icterus.     Conjunctiva/sclera: Conjunctivae normal.   Cardiovascular:      Rate and Rhythm: Normal rate and regular rhythm.      Pulses: Normal pulses.      Heart sounds: Normal heart sounds. No murmur heard.  Pulmonary:      Effort: Pulmonary effort is normal. No respiratory distress.      Breath sounds: Wheezing present.   Abdominal:      Palpations: Abdomen is soft.      Tenderness: There is no abdominal tenderness.   Musculoskeletal:      Right lower leg: No edema.      Left lower leg: No edema.   Skin:     General: Skin is warm.      Coloration: Skin is not jaundiced.   Neurological:      Mental Status: She is alert. Mental status is at baseline.      Gait: Gait normal.   Psychiatric:         Mood and Affect: Mood normal.         Behavior: Behavior normal.         Assessment & Plan:   Assessment & Plan    U07.1 COVID-19  R06.2 Wheezing  R11.0 Nausea   J01.90 Acute Rhinosinusitis  E66.3 Overweight (BMI 25.0 -29.9)    IMPRESSION:  Diagnosed patient with COVID-19  Determined COVID-19 has likely progressed to sinus infection and possible pneumonia due to prolonged symptoms (7+ days)  Assessed for bacterial complications, noting sinus tenderness and red ears  Decided on combination therapy: steroid injection, oral antibiotics, inhaled medications, and oral steroids to address various symptoms and complications  Considered patient's history with Adipex, determining it is safe to restart at a lower dose with gradual  increase    COVID-19:  - Confirmed COVID-19 diagnosis based on symptoms and test results.  - Explained typical COVID-19 course (7-10 days) and potential complications such as sinus infections, otitis media, and pneumonia.  - Instructed patient to continue quarantine until afebrile for 24 hours and cough significantly improves.  - Offered chest XR to rule out pneumonia, but not ordered per patient's request    RESPIRATORY SYMPTOMS:  - Prescribed albuterol inhaler for wheezing.  - Prescribed albuterol plus ipratropium nebulizer solution.    SINUS INFECTION:  - Prescribed oral antibiotics for sinus infection.    STEROID TREATMENT:  - Administered steroid injection in office.  - Prescribed oral steroid pack to begin the following day.    NAUSEA:  - Patient reported nausea, described as heartburn-like, and emesis the previous day.  - Confirmed nausea without active vomiting.  - Prescribed ondansetron: 1-2 dissolving tablets as needed for nausea.    WEIGHT MANAGEMENT:  - Discussed Adipex mechanism of action as a stimulant that decreases appetite and potential side effects like feeling flushed or tachycardia.  - Explained typical Adipex treatment duration (3-6 months) and tapering process.  - Restarted Adipex: Begin with half tablet daily for 2-3 weeks, then increase to full tablet as tolerated.  - Obesity counseling <10 minutes      Follow up if symptoms worsen or fail to improve. In addition to their scheduled follow up, the patient has also been instructed to follow up on as needed basis.    This note was generated with the assistance of ambient listening technology. Verbal consent was obtained by the patient and accompanying visitor(s) for the recording of patient appointment to facilitate this note. I attest to having reviewed and edited the generated note for accuracy, though some syntax or spelling errors may persist. Please contact the author of this note for any clarification.

## 2025-03-23 ENCOUNTER — PATIENT MESSAGE (OUTPATIENT)
Dept: ADMINISTRATIVE | Facility: HOSPITAL | Age: 60
End: 2025-03-23
Payer: COMMERCIAL

## 2025-03-24 DIAGNOSIS — Z12.11 SCREENING FOR COLON CANCER: ICD-10-CM

## 2025-03-27 DIAGNOSIS — R06.2 WHEEZING: ICD-10-CM

## 2025-03-27 DIAGNOSIS — U07.1 COVID-19: ICD-10-CM

## 2025-03-28 RX ORDER — ALBUTEROL SULFATE 90 UG/1
2 INHALANT RESPIRATORY (INHALATION) EVERY 6 HOURS PRN
Qty: 6.7 G | Refills: 2 | Status: SHIPPED | OUTPATIENT
Start: 2025-03-28 | End: 2026-03-28

## 2025-04-17 ENCOUNTER — OFFICE VISIT (OUTPATIENT)
Dept: URGENT CARE | Facility: CLINIC | Age: 60
End: 2025-04-17
Payer: COMMERCIAL

## 2025-04-17 VITALS
OXYGEN SATURATION: 99 % | DIASTOLIC BLOOD PRESSURE: 90 MMHG | TEMPERATURE: 98 F | HEART RATE: 93 BPM | SYSTOLIC BLOOD PRESSURE: 152 MMHG | RESPIRATION RATE: 18 BRPM | WEIGHT: 176 LBS | BODY MASS INDEX: 27.62 KG/M2 | HEIGHT: 67 IN

## 2025-04-17 DIAGNOSIS — M54.2 CERVICALGIA: Primary | ICD-10-CM

## 2025-04-17 PROCEDURE — 99213 OFFICE O/P EST LOW 20 MIN: CPT | Mod: ,,, | Performed by: PHYSICIAN ASSISTANT

## 2025-04-17 RX ORDER — TIZANIDINE HYDROCHLORIDE 2 MG/1
1 CAPSULE, GELATIN COATED ORAL 3 TIMES DAILY PRN
Qty: 9 CAPSULE | Refills: 0 | Status: SHIPPED | OUTPATIENT
Start: 2025-04-17 | End: 2025-04-20

## 2025-04-17 RX ORDER — NABUMETONE 500 MG/1
500 TABLET, FILM COATED ORAL 2 TIMES DAILY PRN
Qty: 20 TABLET | Refills: 0 | Status: SHIPPED | OUTPATIENT
Start: 2025-04-17 | End: 2025-04-27
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
## 2025-04-17 NOTE — PATIENT INSTRUCTIONS
Drink plenty of fluids and get plenty of rest.  Take medication with food.   Lower back stretches daily.  Avoid strenuous lifting, use proper body mechanics.  Apply heating pad, Ice pack, Biofreeze or Epsom salt baths as needed.  Encourage exercise to strengthen core muscles to support your back.  Follow-up with your primary care doctor as needed.   Present to the Emergency Department with any significant change or worsening symptoms.

## 2025-04-18 ENCOUNTER — RESULTS FOLLOW-UP (OUTPATIENT)
Dept: URGENT CARE | Facility: CLINIC | Age: 60
End: 2025-04-18

## 2025-04-22 ENCOUNTER — TELEPHONE (OUTPATIENT)
Dept: PRIMARY CARE CLINIC | Facility: CLINIC | Age: 60
End: 2025-04-22
Payer: COMMERCIAL

## 2025-04-22 NOTE — TELEPHONE ENCOUNTER
----- Message from Kari sent at 4/22/2025  8:07 AM CDT -----  Regarding: results  .Who Called: Christin CLINE ClementCaller is requesting information on test results.Name of Test (Lab/Mammo/Etc): xraysDate of Test: 4/17/25[facility] the test was performed[facility]ing Provider: Jerad Moreno Method of Contact: Phone CallPatient's Preferred Phone Number on File: [phone] Best Call Back Number, if different:Additional Information: test results

## 2025-04-22 NOTE — TELEPHONE ENCOUNTER
Alerted pt that we will discuss her xray at her urgent care follow up appt.  Pt verbalized understanding.

## 2025-04-24 ENCOUNTER — TELEPHONE (OUTPATIENT)
Dept: PRIMARY CARE CLINIC | Facility: CLINIC | Age: 60
End: 2025-04-24
Payer: COMMERCIAL

## 2025-04-25 ENCOUNTER — OFFICE VISIT (OUTPATIENT)
Dept: PRIMARY CARE CLINIC | Facility: CLINIC | Age: 60
End: 2025-04-25
Payer: COMMERCIAL

## 2025-04-25 VITALS
DIASTOLIC BLOOD PRESSURE: 79 MMHG | TEMPERATURE: 98 F | SYSTOLIC BLOOD PRESSURE: 117 MMHG | WEIGHT: 176 LBS | HEIGHT: 67 IN | RESPIRATION RATE: 18 BRPM | BODY MASS INDEX: 27.62 KG/M2 | OXYGEN SATURATION: 98 % | HEART RATE: 80 BPM
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
## 2025-04-25 DIAGNOSIS — M54.2 CERVICALGIA: ICD-10-CM

## 2025-04-25 DIAGNOSIS — M62.838 MUSCLE SPASMS OF NECK: ICD-10-CM

## 2025-04-25 DIAGNOSIS — M50.30 DDD (DEGENERATIVE DISC DISEASE), CERVICAL: Chronic | ICD-10-CM

## 2025-04-25 RX ORDER — METHYLPREDNISOLONE 4 MG/1
TABLET ORAL
Qty: 21 EACH | Refills: 0 | Status: SHIPPED | OUTPATIENT
Start: 2025-04-25
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
## 2025-05-07 ENCOUNTER — TELEPHONE (OUTPATIENT)
Dept: PRIMARY CARE CLINIC | Facility: CLINIC | Age: 60
End: 2025-05-07
Payer: COMMERCIAL

## 2025-05-07 NOTE — TELEPHONE ENCOUNTER
Alerted patient that I would be leaving a note on ms Alvarez desk letter her know that she called to pay a balance. She also stated she had some questions to which I gave her the [facility] patient account customer service phone number and hours [phone]. pt verbalized understanding and was thankful for all the information I provided.

## 2025-06-02 ENCOUNTER — TELEPHONE (OUTPATIENT)
Dept: PRIMARY CARE CLINIC | Facility: CLINIC | Age: 60
End: 2025-06-02
Payer: COMMERCIAL

## 2025-06-02 DIAGNOSIS — Z00.00 WELLNESS EXAMINATION: Primary | ICD-10-CM
